# Patient Record
Sex: MALE | Race: WHITE
[De-identification: names, ages, dates, MRNs, and addresses within clinical notes are randomized per-mention and may not be internally consistent; named-entity substitution may affect disease eponyms.]

---

## 2018-03-29 NOTE — CT
CT ABDOMEN AND PELVIS WITHOUT CONTRAST

STONE PROTOCOL:

 

Date: 3-29-18 

 

History: Back pain, abdominal pain, right sided flank pain. 

 

Comparison: None. 

 

FINDINGS: 

There is volume loss in the lower lobes posteriorly. No pericardial effusion. There is aneurysmal dil
atation of the aorta measuring 6.3 x 6.3 cm for a craniocaudad length of 12 cm. There is a obstructiv
e calculus at the right proximal ureter measuring 2-3 mm causing moderate right sided hydronephrosis.
 This is caudad to the renal pelvis approximately 7 cm. No left sided hydroureteral nephrosis or neph
roureterolithiasis. 

 

No acute osseous abnormality. 

 

No dilated air filled loops of large or small bowel. Appendix is visualized and is normal. 

 

IMPRESSION: 

1. Partially obstructive calculus proximal right ureter, 6-7 cm distal to the right renal pelvis syed
uring 2-3 mm. Moderate right sided hydroureteronephrosis proximally. 

2. Aneurysmal dilatation of the abdominal aorta extending from the level of the renal arteries down t
o the bifurcation of the iliacs measuring 6.3 x 6.3 cm with a craniocaudal length of approximately 12
 cm.

 

CODE: T

 

POS: [facility]

## 2018-09-24 NOTE — CT
CT OF ABDOMEN AND PELVIS PERFORMED WITHOUT CONTRAST ENHANCEMENT:

 

History: Right flank pain and dysuria. 

 

Comparison: 3-29-18

 

FINDINGS: 

The lung bases show some mainly chronic appearing changes with subsegmental atelectasis. 

 

The liver, spleen, pancreas, and gallbladder regions appear unremarkable. 

 

Right and left adrenal glands both have slightly hyperplastic appearance. Right and left kidneys are 
normal in size. There are vascular calcifications noted. There is moderate right sided hydronephrosis
 and hydroureter with perinephric and periureteral fat stranding. The uterus dilate to approximately 
mid pelvis level where there is approximately 4 mm calculus present. This is approximately 3 cm proxi
mal to the right ureterovesical junction. It is possible that this represents 4 mm calcification and 
a tiny punctate calcification directly adjacent to it. This appears to represent migration of the chandler
culus that was seen more proximally on the prior examination. 

 

A infrarenal abdominal aortic aneurysm is again demonstrated. It measures approximately 6.5 cm in siz
e measured in the 6.3 to 6.4 cm range on the prior examination. 

 

Fat containing paraumbilical hernia is noted. The appendix is normal. 

 

IMPRESSION: 

1. Moderate right sided hydronephrosis and hydroureter related to an approximately 4 mm distal right 
ureteral calculus located approximately 3 cm proximal to the right ureterovesical junction. There may
 actually be two small calculi at this level. 

2. 6.5 cm infrarenal abdominal aortic aneurysm. On the previous examination, measurement is fairly si
milar measuring in the 6.3 to 6.4 cm range. Other incidental findings as noted above. 

 

POS: [facility]

## 2019-01-18 ENCOUNTER — HOSPITAL ENCOUNTER (OUTPATIENT)
Dept: HOSPITAL 92 - BICULT | Age: 69
Discharge: HOME | End: 2019-01-18
Payer: MEDICARE

## 2019-01-18 DIAGNOSIS — I65.23: Primary | ICD-10-CM

## 2019-01-18 DIAGNOSIS — I65.21: ICD-10-CM

## 2019-01-18 PROCEDURE — 93880 EXTRACRANIAL BILAT STUDY: CPT

## 2019-01-18 NOTE — ULT
CAROTID ULTRASOUND WITH GRAYSCALE AND DOPPLER DUPLEX COLORFLOW IMAGING

SPECTRAL ANALYSIS PERFORMED:

 

INDICATIONS:

Aortic aneurysm without rupture.

 

FINDINGS:

There is scattered moderate atherosclerotic calcification of the carotid arteries.

 

PEAK SYSTOLIC VELOCITY (CM/S):

Right CCA         73

Left CCA          117

Right ICA         133

Left ICA          105

 

There is antegrade flow within the visualized bilateral vertebral arteries.

 

IMPRESSION:

1.  Moderate (50%-69%) stenosis of the right internal carotid artery.

2.  No hemodynamically significant stenosis of the left internal carotid artery.

 

POS: [facility]

## 2019-04-16 ENCOUNTER — HOSPITAL ENCOUNTER (INPATIENT)
Dept: HOSPITAL 92 - ERS | Age: 69
LOS: 1 days | Discharge: HOME | DRG: 872 | End: 2019-04-17
Attending: FAMILY MEDICINE | Admitting: FAMILY MEDICINE
Payer: MEDICARE

## 2019-04-16 VITALS — BODY MASS INDEX: 30.7 KG/M2

## 2019-04-16 DIAGNOSIS — Z79.899: ICD-10-CM

## 2019-04-16 DIAGNOSIS — F10.10: ICD-10-CM

## 2019-04-16 DIAGNOSIS — A09: ICD-10-CM

## 2019-04-16 DIAGNOSIS — I10: ICD-10-CM

## 2019-04-16 DIAGNOSIS — J44.9: ICD-10-CM

## 2019-04-16 DIAGNOSIS — I71.4: ICD-10-CM

## 2019-04-16 DIAGNOSIS — A41.9: Primary | ICD-10-CM

## 2019-04-16 DIAGNOSIS — Z79.52: ICD-10-CM

## 2019-04-16 DIAGNOSIS — K57.92: ICD-10-CM

## 2019-04-16 DIAGNOSIS — F17.210: ICD-10-CM

## 2019-04-16 LAB
ALBUMIN SERPL BCG-MCNC: 3.9 G/DL (ref 3.4–4.8)
ALP SERPL-CCNC: 108 U/L (ref 40–150)
ALT SERPL W P-5'-P-CCNC: 18 U/L (ref 8–55)
ANION GAP SERPL CALC-SCNC: 13 MMOL/L (ref 10–20)
ANISOCYTOSIS BLD QL SMEAR: (no result) (100X)
AST SERPL-CCNC: 21 U/L (ref 5–34)
BACTERIA UR QL AUTO: (no result) HPF
BILIRUB SERPL-MCNC: 1.8 MG/DL (ref 0.2–1.2)
BUN SERPL-MCNC: 16 MG/DL (ref 8.4–25.7)
CALCIUM SERPL-MCNC: 9.8 MG/DL (ref 7.8–10.44)
CHLORIDE SERPL-SCNC: 99 MMOL/L (ref 98–107)
CO2 SERPL-SCNC: 26 MMOL/L (ref 23–31)
CREAT CL PREDICTED SERPL C-G-VRATE: 0 ML/MIN (ref 70–130)
CRYSTAL-AUWI FLAG: 0.1 (ref 0–15)
GLOBULIN SER CALC-MCNC: 3.7 G/DL (ref 2.4–3.5)
GLUCOSE SERPL-MCNC: 101 MG/DL (ref 80–115)
HEV IGM SER QL: 13.1 (ref 0–7.99)
HGB BLD-MCNC: 16 G/DL (ref 14–18)
HYALINE CASTS #/AREA URNS LPF: (no result) LPF
MCH RBC QN AUTO: 34.5 PG (ref 27–31)
MCV RBC AUTO: 104 FL (ref 78–98)
MDIFF COMPLETE?: YES
PATHC CAST-AUWI FLAG: 1.36 (ref 0–2.49)
PLATELET # BLD AUTO: 246 THOU/UL (ref 130–400)
POTASSIUM SERPL-SCNC: 4.3 MMOL/L (ref 3.5–5.1)
PROT UR STRIP.AUTO-MCNC: 100 MG/DL
RBC # BLD AUTO: 4.63 MILL/UL (ref 4.7–6.1)
RBC UR QL AUTO: (no result) HPF (ref 0–3)
SODIUM SERPL-SCNC: 134 MMOL/L (ref 136–145)
SP GR UR STRIP: 1.02 (ref 1–1.04)
SPERM-AUWI FLAG: 0 (ref 0–9.9)
WBC # BLD AUTO: 24 THOU/UL (ref 4.8–10.8)
WBC UR QL AUTO: (no result) HPF (ref 0–3)
YEAST-AUWI FLAG: 0 (ref 0–25)

## 2019-04-16 PROCEDURE — 80048 BASIC METABOLIC PNL TOTAL CA: CPT

## 2019-04-16 PROCEDURE — 93005 ELECTROCARDIOGRAM TRACING: CPT

## 2019-04-16 PROCEDURE — 96366 THER/PROPH/DIAG IV INF ADDON: CPT

## 2019-04-16 PROCEDURE — 80053 COMPREHEN METABOLIC PANEL: CPT

## 2019-04-16 PROCEDURE — 96361 HYDRATE IV INFUSION ADD-ON: CPT

## 2019-04-16 PROCEDURE — 81015 MICROSCOPIC EXAM OF URINE: CPT

## 2019-04-16 PROCEDURE — 81003 URINALYSIS AUTO W/O SCOPE: CPT

## 2019-04-16 PROCEDURE — 83605 ASSAY OF LACTIC ACID: CPT

## 2019-04-16 PROCEDURE — 74177 CT ABD & PELVIS W/CONTRAST: CPT

## 2019-04-16 PROCEDURE — 71045 X-RAY EXAM CHEST 1 VIEW: CPT

## 2019-04-16 PROCEDURE — 96365 THER/PROPH/DIAG IV INF INIT: CPT

## 2019-04-16 PROCEDURE — 87086 URINE CULTURE/COLONY COUNT: CPT

## 2019-04-16 PROCEDURE — 87040 BLOOD CULTURE FOR BACTERIA: CPT

## 2019-04-16 PROCEDURE — 85025 COMPLETE CBC W/AUTO DIFF WBC: CPT

## 2019-04-16 PROCEDURE — 36415 COLL VENOUS BLD VENIPUNCTURE: CPT

## 2019-04-16 RX ADMIN — MEROPENEM AND SODIUM CHLORIDE SCH MLS: 1 INJECTION, SOLUTION INTRAVENOUS at 22:53
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
## 2019-04-16 NOTE — RAD
PORTABLE CHEST:

4/16/19

 

HISTORY: 

Epigastric pain. 

 

Heart size is within normal limits. There are atherosclerotic changes of the aorta. There is some mil
d chronic appearing lung changes seen. No signs of fracture or focal infiltrates. 

 

IMPRESSION:  

Chronic appearing lung change.

 

POS: [facility]

## 2019-04-16 NOTE — CT
CT OF ABDOMEN AND PELVIS PERFORMED WITH INTRAVENOUS CONTRAST ENHANCEMENT:

4/16/19

 

HISTORY: 

Abdominal pain, fever, and chills. 

 

COMPARISON:  

9/24/18 study.

 

The lung bases show some atelectatic change. The liver, spleen, and pancreas as well as gallbladder r
egions appear unremarkable.

 

Right and left adrenal glands are normal. There is a nonobstructing 4 to 5 mm lower pole right renal 
calculus. There are vascular calcifications associated with both the right and left renal pelvis. No 
ureteral dilatation or evidence for a ureteral calculus. The infrarenal abdominal aortic aneurysm has
 increased in size and measures 7.1 cm as compared to measurements more in the 6.5 to 6.6 cm range on
 the prior examination. It extends to the bifurcation, but does not involve the iliac arteries. 

 

There is marked inflammatory changes around the right colon, mainly the cecum region and what appears
 to represent a contained perforation. This is not related to the appendix as the appendix is actuall
y visualized  extending into the right inguinal canal. It is normal in caliber. 

 

Bladder wall appears slightly thickened even considering underdistention. No free fluid within the pe
lvis. 

 

IMPRESSION:  

1.      Marked inflammatory change around the right colon mainly the cecum region. I suspect that the
re is a contained perforation. There is wall thickening in this region. The etiology of this is uncle
ar. It could be related to diverticular disease or other causes of colitis. The appendix itself is no
rmal in caliber. The tip of the appendix actually extends into the right inguinal canal. No free air 
is demonstrated and no free fluid or any defined abscess collection. 

2.      Interval increase in size of the infrarenal abdominal aortic aneurysm, now measuring 7.1 cm. 


3.      Punctate nonobstructing right renal calculus. 

4.      Fat containing periumbilical hernia. 

 

POS: [facility] [place]

## 2019-04-17 VITALS — DIASTOLIC BLOOD PRESSURE: 73 MMHG | SYSTOLIC BLOOD PRESSURE: 124 MMHG | TEMPERATURE: 98.3 F

## 2019-04-17 LAB
ANION GAP SERPL CALC-SCNC: 12 MMOL/L (ref 10–20)
BASOPHILS # BLD AUTO: 0.1 THOU/UL (ref 0–0.2)
BASOPHILS NFR BLD AUTO: 0.4 % (ref 0–1)
BUN SERPL-MCNC: 13 MG/DL (ref 8.4–25.7)
CALCIUM SERPL-MCNC: 8.4 MG/DL (ref 7.8–10.44)
CHLORIDE SERPL-SCNC: 103 MMOL/L (ref 98–107)
CO2 SERPL-SCNC: 22 MMOL/L (ref 23–31)
CREAT CL PREDICTED SERPL C-G-VRATE: 89 ML/MIN (ref 70–130)
EOSINOPHIL # BLD AUTO: 0.3 THOU/UL (ref 0–0.7)
EOSINOPHIL NFR BLD AUTO: 1.6 % (ref 0–10)
GLUCOSE SERPL-MCNC: 95 MG/DL (ref 80–115)
HGB BLD-MCNC: 14.5 G/DL (ref 14–18)
LYMPHOCYTES # BLD: 2.3 THOU/UL (ref 1.2–3.4)
LYMPHOCYTES NFR BLD AUTO: 12.2 % (ref 21–51)
MCH RBC QN AUTO: 33.9 PG (ref 27–31)
MCV RBC AUTO: 103 FL (ref 78–98)
MONOCYTES # BLD AUTO: 1.7 THOU/UL (ref 0.11–0.59)
MONOCYTES NFR BLD AUTO: 9.4 % (ref 0–10)
NEUTROPHILS # BLD AUTO: 14.1 THOU/UL (ref 1.4–6.5)
NEUTROPHILS NFR BLD AUTO: 76.4 % (ref 42–75)
PLATELET # BLD AUTO: 235 THOU/UL (ref 130–400)
POTASSIUM SERPL-SCNC: 3.8 MMOL/L (ref 3.5–5.1)
RBC # BLD AUTO: 4.28 MILL/UL (ref 4.7–6.1)
SODIUM SERPL-SCNC: 133 MMOL/L (ref 136–145)
WBC # BLD AUTO: 18.5 THOU/UL (ref 4.8–10.8)

## 2019-04-17 RX ADMIN — MEROPENEM AND SODIUM CHLORIDE SCH: 1 INJECTION, SOLUTION INTRAVENOUS at 16:56

## 2019-04-17 RX ADMIN — MEROPENEM AND SODIUM CHLORIDE SCH MLS: 1 INJECTION, SOLUTION INTRAVENOUS at 05:01
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
## 2019-04-17 NOTE — PDOC.PN
- Subjective


Encounter Start Date: 04/17/19


Encounter Start Time: 11:30


Subjective: Patient reports complete resolution of pain. No pain on palpation. (

Wife 


-: thinks he is lying about this so he can leave today.) No N/V. No fever.


-: Normal bowel movement this AM without blood or mucus.





- Objective


Resuscitation Status - Order Detail:





04/16/19 20:05


Resuscitation Status Routine 


   Resuscitation Status: FULL: Full Resuscitation


   Discussed with: Patient








MAR Reviewed: Yes


Vital Signs & Weight: 


 Vital Signs (12 hours)











  Temp Pulse Resp BP Pulse Ox


 


 04/17/19 07:21  98.5 F  78  16  118/71  94 L


 


 04/17/19 04:00  98.5 F  86  20  116/73  93 L


 


 04/16/19 23:36  98.3 F  80  20  125/80  93 L








 Weight











Weight                         220 lb 7.396 oz














Result Diagrams: 


 04/17/19 04:54





 04/17/19 04:54





Phys Exam





- Physical Examination


Constitutional: NAD


HEENT: moist MMs


Respiratory: no wheezing, no rales, no rhonchi


Cardiovascular: RRR, no significant murmur


Gastrointestinal: soft, non-tender, no distention, positive bowel sounds


Neurological: non-focal, moves all 4 limbs


Psychiatric: normal affect, A&O x 3





Dx/Plan


(1) Infectious colitis


Code(s): A09 - INFECTIOUS GASTROENTERITIS AND COLITIS, UNSPECIFIED   Status: 

Acute   Comment: improving, advance diet, continue antibiotics- Meropenem since 

4/16/19   





(2) Sepsis


Code(s): A41.9 - SEPSIS, UNSPECIFIED ORGANISM   Status: Acute   Comment: 

improving   





(3) COPD (chronic obstructive pulmonary disease)


Status: Chronic   





(4) HTN (hypertension)


Code(s): I10 - ESSENTIAL (PRIMARY) HYPERTENSION   Status: Chronic   





(5) Alcohol abuse


Code(s): F10.10 - ALCOHOL ABUSE, UNCOMPLICATED   Status: Chronic   Comment: [facility] 

protocol   





(6) Tobacco abuse


Code(s): Z72.0 - TOBACCO USE   Status: Chronic   





- Plan


cont current plan of care, continue antibiotics, out of bed/ambulate, DVT proph 

w/lovenox, DVT proph w/SCDs


Patient insistent on leaving today. I stressed to him that we need to give 


-: at least one more day of IV antibiotics and make sure pain continues to 


-: improve and able to tolerate diet. He eventually reluctantly agreed to stay


-: until tomorrow morning when we recheck CBC.





* .








- Discharge Day


Encounter end time: 11:40

## 2019-04-17 NOTE — PDOC.GSPN
Surgery Progress Note: Subj





- Subjective


Narrative: 





Patient feels good. He denies any abdominal pain. No nausea or vomiting. No 

fevers overnight. White count is down a little bit. He still has some 

tenderness in the right lower quadrant but this is much less than yesterday and 

very mild. No rigidity rebound or guarding.





Assessment/plan: Colitis responding to medical management. Okay from a surgical 

standpoint to advance his diet. Continue antibiotics.





Surgery Progress Note: Obj





- Vital signs


Vital signs: 


 Vital Signs - Most Recent











Temp Pulse Resp BP Pulse Ox


 


 98.5 F   78   16   118/71   94 L


 


 04/17/19 07:21  04/17/19 07:21  04/17/19 07:21  04/17/19 07:21  04/17/19 07:21














Surgery Progress Note: Results





- Labs


Result Diagrams: 


 04/17/19 04:54





 04/17/19 04:54


Lab results: 


 Laboratory Results - last 24 hr











  04/17/19 04/17/19





  04:54 04:54


 


WBC   18.5 H


 


RBC   4.28 L


 


Hgb   14.5


 


Hct   44.0


 


MCV   103.0 H


 


MCH   33.9 H


 


MCHC   32.9


 


RDW   14.6 H


 


Plt Count   235


 


MPV   8.9


 


Neutrophils %   76.4 H


 


Lymphocytes %   12.2 L


 


Monocytes %   9.4


 


Eosinophils %   1.6


 


Basophils %   0.4


 


Neutrophils #   14.1 H


 


Lymphocytes #   2.3


 


Monocytes #   1.7 H


 


Eosinophils #   0.3


 


Basophils #   0.1


 


Sodium  133 L 


 


Potassium  3.8 


 


Chloride  103 


 


Carbon Dioxide  22 L 


 


Anion Gap  12 


 


BUN  13 


 


Creatinine  1.09 


 


Estimated GFR (MDRD)  67 


 


Glucose  95 


 


Calcium  8.4

## 2019-04-18 NOTE — DIS
DATE OF ADMISSION:  04/16/2019



DATE OF DISCHARGE:  04/17/2019



PRIMARY CARE PHYSICIAN:  Dr. Robinson Rico.



REASON FOR ADMISSION:  Abdominal pain and fever.



DIAGNOSES AT DISCHARGE:  

1. Infectious colitis with possible contained perforation.

2. Sepsis.

3. Chronic obstructive pulmonary disease.

4. Hypertension.

5. Alcohol abuse.

6. Tobacco abuse.

7. Enlarging abdominal aortic aneurysm, asymptomatic.



PROCEDURES:  CT abdomen and pelvis with contrast showing marked inflammatory change

on the right colon mainly the cecal region, suspecting a contained perforation with

some wall thickening in the region, possibly related to diverticular disease versus

colitis.  Normal appearing appendix.  Also with continued abdominal aortic aneurysm,

now 7.1 cm in size. 



CONSULTATIONS:  General Surgery, Dr. Knight.



SUMMARY OF HOSPITAL COURSE:  This is a 68-year-old white male, complaining of a

2-day history of right lower quadrant abdominal pain, coming and going in intensity,

associated with fevers and chills.  He had an elevated white blood cell count and

tachycardia in the emergency room, had a CT scan with above results.  The patient

was given antibiotics in the emergency room and started on meropenem.  He was

admitted to the hospital.  Dr. Knight was consulted, who determined this was

nonsurgical and recommended antibiotic treatment.  The patient had resolution of his

pain overnight.  He was able to eat a normal diet today.  No fever.  His white blood

cell count did improve from 24,000 to 18,000.  I did strongly recommend that we do

at least one more day of IV antibiotics, however, patient continued to insist on

going home.  Dr. Knight stated that he did not need surgery and that he could be

discharged from a surgical standpoint.  The patient is not nauseated or vomiting,

and is having normal bowel movements and eating well, so I am letting him go home on

oral antibiotics.  I am recommending that he stop all alcohol while he is on his

antibiotics as it can interact with the metronidazole and make him nauseated. 



DISCHARGE MANAGEMENT:  Discharged home.



ACTIVITY:  As tolerated.



DIET:  Healthy heart diet.



FOLLOWUP:  Follow up with Dr. Rico in 7 days.  He will need to discuss the

enlarging aortic aneurysm and consider getting follow up with a specialist for

endovascular repair, which is how he wants it. 



DISCHARGE MEDICATIONS:  

1. Ciprofloxacin 500 mg twice a day for 12 days.

2. Metronidazole 500 mg 3 times a day for 12 days.

3. Continue amlodipine 5 mg daily.

4. Advair Diskus 250/50 mcg, one puff inhaled as directed.

5. Incruse Ellipta one inhalation daily.

6. Hyzaar 50/12.5 mg one tablet twice a day. 

Arranging the details of this discharge took 35 minutes.







Job ID:  [id number]

## 2020-07-06 ENCOUNTER — HOSPITAL ENCOUNTER (OUTPATIENT)
Dept: HOSPITAL 92 - BICCT | Age: 70
Discharge: HOME | End: 2020-07-06
Attending: FAMILY MEDICINE
Payer: MEDICARE

## 2020-07-06 DIAGNOSIS — K57.30: ICD-10-CM

## 2020-07-06 DIAGNOSIS — K40.90: ICD-10-CM

## 2020-07-06 DIAGNOSIS — N20.0: ICD-10-CM

## 2020-07-06 DIAGNOSIS — I71.4: Primary | ICD-10-CM

## 2020-07-06 DIAGNOSIS — R10.9: ICD-10-CM

## 2020-07-06 PROCEDURE — 74177 CT ABD & PELVIS W/CONTRAST: CPT

## 2020-07-06 NOTE — CT
CT ABDOMEN AND PELVIS WITH IV CONTRAST:

 

Date:  07/06/2020

 

HISTORY:  

Right-sided flank pain. Abdominal aortic aneurysm. 

 

COMPARISON:  

04/16/2019. 

 

FINDINGS:

Atelectatic changes in the lung bases are again seen. 

 

Liver, spleen, pancreas, and left kidney are normal. Prominent adrenal glands are stable. There is a 
6.0 mm calculus in the right kidney. Right perinephric inflammatory changes are stable. No hydrourete
ronephrosis is noted on either side. 

 

No free air, free fluid, or lymphadenopathy seen in the abdomen or pelvis. There has been interval in
crease in size of the intrarenal abdominal aortic aneurysm which currently measures 8.0 cm. 

 

The small bowel loops are not abnormally dilated. 

 

Fat-containing right periumbilical hernia is again seen. 

 

There is colonic diverticulosis. There is right inguinal hernia containing fat and a normal appearing
 appendix. There are degenerative changes in the spine. 

 

IMPRESSION: 

1.  Increasing size of the abdominal aortic aneurysm since 04/16/2019, currently measuring 8.0 cm. 

 

2.  Nonobstructing 6.0 mm right renal calculus. 

 

3.  Right inguinal hernia containing appendix. 

 

4.  Colonic diverticulosis. 

 

CODE T. 

 

 

POS: [facility]

## 2020-08-31 ENCOUNTER — HOSPITAL ENCOUNTER (OUTPATIENT)
Dept: HOSPITAL 92 - BICRAD | Age: 70
Discharge: HOME | End: 2020-08-31
Attending: PHYSICIAN ASSISTANT
Payer: MEDICARE

## 2020-08-31 DIAGNOSIS — M47.816: ICD-10-CM

## 2020-08-31 DIAGNOSIS — Z87.442: ICD-10-CM

## 2020-08-31 DIAGNOSIS — M54.5: ICD-10-CM

## 2020-08-31 DIAGNOSIS — M54.6: Primary | ICD-10-CM

## 2020-08-31 PROCEDURE — 74018 RADEX ABDOMEN 1 VIEW: CPT

## 2020-08-31 PROCEDURE — 72100 X-RAY EXAM L-S SPINE 2/3 VWS: CPT

## 2020-08-31 PROCEDURE — 72072 X-RAY EXAM THORAC SPINE 3VWS: CPT

## 2020-08-31 NOTE — RAD
EXAM: Single view of the abdomen



HISTORY: History of kidney stones



COMPARISON: None



FINDINGS: Single view of the abdomen shows a nonspecific, nonobstructive bowel gas pattern.  No suspi
cious calcifications are seen.   Mild degenerative changes are seen in the spine.



IMPRESSION: Unremarkable exam



Reported By: Zac Heart 

Electronically Signed:  8/31/2020 10:49 AM

## 2020-08-31 NOTE — RAD
EXAM: 3 views of the thoracic spine



HISTORY: Thoracic spine pain



COMPARISON: None



FINDINGS: 3 views of the thoracic spine shows normal height and alignment of the vertebral bodies and
 intervertebral discs without fracture or subluxation. No significant degenerative changes are

seen.



IMPRESSION: No significant thoracic spine abnormality.



Reported By: Zac Heart 

Electronically Signed:  8/31/2020 10:58 AM

## 2020-08-31 NOTE — RAD
EXAM: 2 views of the lumbosacral spine



HISTORY: Low back pain



COMPARISON: None



FINDINGS: 2 views of the lumbosacral spine shows normal height and alignment of the vertebral bodies 
and intervertebral discs without fracture or subluxation. Small osteophytes are seen throughout the

lumbar spine. Posterior facet arthrosis is seen in the lower lumbosacral spine.



The sacroiliac joints are unremarkable.



IMPRESSION: Degenerative changes of the lumbar spine without acute osseous abnormality



Reported By: Zac Heart 

Electronically Signed:  8/31/2020 10:57 AM